# Patient Record
Sex: FEMALE | Race: WHITE | URBAN - NONMETROPOLITAN AREA
[De-identification: names, ages, dates, MRNs, and addresses within clinical notes are randomized per-mention and may not be internally consistent; named-entity substitution may affect disease eponyms.]

---

## 2019-06-06 ENCOUNTER — APPOINTMENT (OUTPATIENT)
Dept: URBAN - NONMETROPOLITAN AREA CLINIC 39 | Age: 54
Setting detail: DERMATOLOGY
End: 2019-06-06

## 2019-06-06 DIAGNOSIS — L91.8 OTHER HYPERTROPHIC DISORDERS OF THE SKIN: ICD-10-CM

## 2019-06-06 DIAGNOSIS — D18.0 HEMANGIOMA: ICD-10-CM

## 2019-06-06 DIAGNOSIS — L82.1 OTHER SEBORRHEIC KERATOSIS: ICD-10-CM

## 2019-06-06 DIAGNOSIS — L71.8 OTHER ROSACEA: ICD-10-CM

## 2019-06-06 PROBLEM — D18.01 HEMANGIOMA OF SKIN AND SUBCUTANEOUS TISSUE: Status: ACTIVE | Noted: 2019-06-06

## 2019-06-06 PROBLEM — M12.9 ARTHROPATHY, UNSPECIFIED: Status: ACTIVE | Noted: 2019-06-06

## 2019-06-06 PROCEDURE — OTHER PRESCRIPTION: OTHER

## 2019-06-06 PROCEDURE — OTHER REASSURANCE: OTHER

## 2019-06-06 PROCEDURE — OTHER MIPS QUALITY: OTHER

## 2019-06-06 PROCEDURE — 99202 OFFICE O/P NEW SF 15 MIN: CPT

## 2019-06-06 PROCEDURE — OTHER COUNSELING: OTHER

## 2019-06-06 RX ORDER — IVERMECTIN 10 MG/G
CREAM TOPICAL
Qty: 1 | Refills: 2 | Status: ERX | COMMUNITY
Start: 2019-06-06

## 2019-06-06 ASSESSMENT — LOCATION SIMPLE DESCRIPTION DERM
LOCATION SIMPLE: RIGHT UPPER BACK
LOCATION SIMPLE: CHEST
LOCATION SIMPLE: RIGHT CHEEK
LOCATION SIMPLE: LEFT CHEEK
LOCATION SIMPLE: LEFT UPPER BACK

## 2019-06-06 ASSESSMENT — LOCATION DETAILED DESCRIPTION DERM
LOCATION DETAILED: LEFT INFERIOR CENTRAL MALAR CHEEK
LOCATION DETAILED: UPPER STERNUM
LOCATION DETAILED: RIGHT INFERIOR CENTRAL MALAR CHEEK
LOCATION DETAILED: RIGHT MEDIAL UPPER BACK
LOCATION DETAILED: LEFT MEDIAL UPPER BACK

## 2019-06-06 ASSESSMENT — LOCATION ZONE DERM
LOCATION ZONE: TRUNK
LOCATION ZONE: FACE

## 2019-06-10 ENCOUNTER — RX ONLY (RX ONLY)
Age: 54
End: 2019-06-10

## 2019-06-10 RX ORDER — METRONIDAZOLE 7.5 MG/G
CREAM TOPICAL
Qty: 1 | Refills: 3 | Status: ERX | COMMUNITY
Start: 2019-06-10

## 2019-09-06 ENCOUNTER — APPOINTMENT (OUTPATIENT)
Dept: URBAN - NONMETROPOLITAN AREA CLINIC 39 | Age: 54
Setting detail: DERMATOLOGY
End: 2019-09-06

## 2019-09-06 DIAGNOSIS — L71.8 OTHER ROSACEA: ICD-10-CM

## 2019-09-06 PROCEDURE — OTHER PRESCRIPTION: OTHER

## 2019-09-06 PROCEDURE — 99212 OFFICE O/P EST SF 10 MIN: CPT

## 2019-09-06 PROCEDURE — OTHER RECOMMENDATIONS: OTHER

## 2019-09-06 PROCEDURE — OTHER COUNSELING: OTHER

## 2019-09-06 PROCEDURE — OTHER REASSURANCE: OTHER

## 2019-09-06 PROCEDURE — OTHER ADDITIONAL NOTES: OTHER

## 2019-09-06 PROCEDURE — OTHER MIPS QUALITY: OTHER

## 2019-09-06 RX ORDER — METRONIDAZOLE 7.5 MG/G
CREAM TOPICAL
Qty: 1 | Refills: 5 | Status: ERX | COMMUNITY
Start: 2019-09-06

## 2019-09-06 ASSESSMENT — LOCATION DETAILED DESCRIPTION DERM
LOCATION DETAILED: LEFT INFERIOR CENTRAL MALAR CHEEK
LOCATION DETAILED: RIGHT INFERIOR CENTRAL MALAR CHEEK

## 2019-09-06 ASSESSMENT — LOCATION SIMPLE DESCRIPTION DERM
LOCATION SIMPLE: LEFT CHEEK
LOCATION SIMPLE: RIGHT CHEEK

## 2019-09-06 ASSESSMENT — LOCATION ZONE DERM: LOCATION ZONE: FACE

## 2019-09-06 NOTE — PROCEDURE: ADDITIONAL NOTES
Detail Level: Zone
Additional Notes: Patient reports dry, sensitive skin.Patient provided with a handout with a list of recommended moisturizers.\\nDiscussed Rhofade with patient. Patient reports she is going to purchase OTC nasal spray with oxymetazoline and try using that on her face. Pt notes she has done lots of research and heard of people who have success with this. Patient reports if she notices improvement, she will contact the office if interested in a prescription strength oxymetazoline. \\nPatient already doing IPL laser every six months. Pt looking for more day-to-day management of flushing symptoms.

## 2020-03-09 ENCOUNTER — APPOINTMENT (OUTPATIENT)
Dept: URBAN - NONMETROPOLITAN AREA CLINIC 39 | Age: 55
Setting detail: DERMATOLOGY
End: 2020-03-09

## 2020-03-09 DIAGNOSIS — L71.8 OTHER ROSACEA: ICD-10-CM

## 2020-03-09 PROCEDURE — OTHER PRESCRIPTION: OTHER

## 2020-03-09 PROCEDURE — OTHER MIPS QUALITY: OTHER

## 2020-03-09 PROCEDURE — OTHER REASSURANCE: OTHER

## 2020-03-09 PROCEDURE — OTHER ADDITIONAL NOTES: OTHER

## 2020-03-09 PROCEDURE — 99212 OFFICE O/P EST SF 10 MIN: CPT

## 2020-03-09 PROCEDURE — OTHER COUNSELING: OTHER

## 2020-03-09 PROCEDURE — OTHER RECOMMENDATIONS: OTHER

## 2020-03-09 RX ORDER — METRONIDAZOLE 7.5 MG/G
CREAM TOPICAL
Qty: 1 | Refills: 5 | Status: ERX

## 2020-03-09 ASSESSMENT — LOCATION ZONE DERM: LOCATION ZONE: FACE

## 2020-03-09 ASSESSMENT — LOCATION SIMPLE DESCRIPTION DERM
LOCATION SIMPLE: LEFT CHEEK
LOCATION SIMPLE: RIGHT CHEEK

## 2020-03-09 NOTE — PROCEDURE: ADDITIONAL NOTES
Detail Level: Detailed
Additional Notes: Pt reports she has cut out all animal protein in her diet and began using natural skincare products and this has helped minimize her rosacea flares. Pt still reports some dryness. Pt instructed to continue moisturizing daily. Pt counseled on proper on application of skincare products- serums-moisturizers-oil. Pt states she has been applying her vitamin E oil first. \\nPt interested in IPL treatment. Offered referral to [facility], pt declines at this time. \\nPt also interested in dysport/Botox cosmetic treatment. Pt instructed to schedule back for cosmetic consultation and treatment (30 minute appointment).

## 2020-09-08 ENCOUNTER — APPOINTMENT (OUTPATIENT)
Dept: URBAN - NONMETROPOLITAN AREA CLINIC 39 | Age: 55
Setting detail: DERMATOLOGY
End: 2020-09-08

## 2020-09-08 DIAGNOSIS — D485 NEOPLASM OF UNCERTAIN BEHAVIOR OF SKIN: ICD-10-CM

## 2020-09-08 DIAGNOSIS — L71.8 OTHER ROSACEA: ICD-10-CM

## 2020-09-08 DIAGNOSIS — L82.1 OTHER SEBORRHEIC KERATOSIS: ICD-10-CM

## 2020-09-08 DIAGNOSIS — L82.0 INFLAMED SEBORRHEIC KERATOSIS: ICD-10-CM

## 2020-09-08 PROBLEM — D48.5 NEOPLASM OF UNCERTAIN BEHAVIOR OF SKIN: Status: ACTIVE | Noted: 2020-09-08

## 2020-09-08 PROCEDURE — OTHER BENIGN DESTRUCTION: OTHER

## 2020-09-08 PROCEDURE — 17110 DESTRUCT B9 LESION 1-14: CPT

## 2020-09-08 PROCEDURE — OTHER RECOMMENDATIONS: OTHER

## 2020-09-08 PROCEDURE — OTHER BIOPSY BY SHAVE METHOD: OTHER

## 2020-09-08 PROCEDURE — 11102 TANGNTL BX SKIN SINGLE LES: CPT | Mod: 59

## 2020-09-08 PROCEDURE — OTHER COUNSELING: OTHER

## 2020-09-08 PROCEDURE — 11103 TANGNTL BX SKIN EA SEP/ADDL: CPT | Mod: 59

## 2020-09-08 PROCEDURE — OTHER PRESCRIPTION: OTHER

## 2020-09-08 PROCEDURE — 99213 OFFICE O/P EST LOW 20 MIN: CPT | Mod: 25

## 2020-09-08 PROCEDURE — OTHER ADDITIONAL NOTES: OTHER

## 2020-09-08 PROCEDURE — OTHER MIPS QUALITY: OTHER

## 2020-09-08 PROCEDURE — OTHER REASSURANCE: OTHER

## 2020-09-08 RX ORDER — METRONIDAZOLE 7.5 MG/G
CREAM TOPICAL
Qty: 1 | Refills: 5 | Status: ERX

## 2020-09-08 ASSESSMENT — LOCATION SIMPLE DESCRIPTION DERM
LOCATION SIMPLE: RIGHT SHOULDER
LOCATION SIMPLE: RIGHT THIGH
LOCATION SIMPLE: RIGHT CHEEK
LOCATION SIMPLE: LEFT CHEEK
LOCATION SIMPLE: RIGHT PRETIBIAL REGION
LOCATION SIMPLE: SCALP

## 2020-09-08 ASSESSMENT — LOCATION DETAILED DESCRIPTION DERM
LOCATION DETAILED: RIGHT INFERIOR CENTRAL MALAR CHEEK
LOCATION DETAILED: LEFT INFERIOR CENTRAL MALAR CHEEK
LOCATION DETAILED: RIGHT ANTERIOR PROXIMAL THIGH
LOCATION DETAILED: LEFT INFERIOR POSTAURICULAR SKIN
LOCATION DETAILED: RIGHT ANTERIOR SHOULDER
LOCATION DETAILED: RIGHT MEDIAL DISTAL PRETIBIAL REGION

## 2020-09-08 ASSESSMENT — LOCATION ZONE DERM
LOCATION ZONE: FACE
LOCATION ZONE: LEG
LOCATION ZONE: ARM
LOCATION ZONE: SCALP

## 2020-09-08 NOTE — PROCEDURE: BENIGN DESTRUCTION
Consent: The patient's consent was obtained including but not limited to risks of crusting, scabbing, blistering, scarring, darker or lighter pigmentary change, recurrence, incomplete removal and infection.
Post-Care Instructions: I reviewed with the patient in detail post-care instructions. Patient is to wear sunprotection, and avoid picking at any of the treated lesions. Pt may apply Vaseline to crusted or scabbing areas.
Include Z78.9 (Other Specified Conditions Influencing Health Status) As An Associated Diagnosis?: Yes
Render Post-Care Instructions In Note?: no
Detail Level: Detailed
Treatment Number (Will Not Render If 0): 0
Anesthesia Volume In Cc: 0.5
Medical Necessity Clause: This procedure was medically necessary because the lesions that were treated were:
Medical Necessity Information: It is in your best interest to select a reason for this procedure from the list below. All of these items fulfill various CMS LCD requirements except the new and changing color options.

## 2020-09-08 NOTE — HPI: SKIN LESION
Has Your Skin Lesion Been Treated?: not been treated
Is This A New Presentation, Or A Follow-Up?: Skin Lesion
How Severe Is Your Skin Lesion?: moderate
How Severe Is Your Skin Lesion?: mild
Is This A New Presentation, Or A Follow-Up?: Skin Lesions

## 2020-09-08 NOTE — PROCEDURE: ADDITIONAL NOTES
Detail Level: Detailed
Additional Notes: Acneiform component of rosacea improved significantly, erythrotelangiectatic component remains unchanged. Discussed treatments for erythema again. Pt states she has “money left to spend” at her previous dermatology office on laser treatments. Pt states she will schedule for laser treatment. Offered referral to [facility], pt declines at this time. Pt to continue metronidazole cream BID. Refills sent today.

## 2020-09-08 NOTE — PROCEDURE: BIOPSY BY SHAVE METHOD
Electrodesiccation Text: The wound bed was treated with electrodesiccation after the biopsy was performed.
Hide Anticipated Plan (Based On Presumed Biopsy Results)?: No
Size Of Lesion In Cm: 0.7
Biopsy Type: H and E
Detail Level: Detailed
Notification Instructions: Patient will be notified of biopsy results. However, patient instructed to call the office if not contacted within 2 weeks.
Silver Nitrate Text: The wound bed was treated with silver nitrate after the biopsy was performed.
Validate Note Data (See Information Below): Yes
Anesthesia Type: 1% Xylocaine with epinephrine
Cryotherapy Text: The wound bed was treated with cryotherapy after the biopsy was performed.
Information: Selecting Yes will display possible errors in your note based on the variables you have selected. This validation is only offered as a suggestion for you. PLEASE NOTE THAT THE VALIDATION TEXT WILL BE REMOVED WHEN YOU FINALIZE YOUR NOTE. IF YOU WANT TO FAX A PRELIMINARY NOTE YOU WILL NEED TO TOGGLE THIS TO 'NO' IF YOU DO NOT WANT IT IN YOUR FAXED NOTE.
Additional Anesthesia Volume In Cc (Will Not Render If 0): 0
Hemostasis: Macario's
Type Of Destruction Used: Electrodesiccation
Wound Care: Petrolatum
Consent: Written consent was obtained and risks were reviewed including but not limited to scarring, infection, bleeding, scabbing, incomplete removal, nerve damage and allergy to anesthesia.
Electrodesiccation And Curettage Text: The wound bed was treated with electrodesiccation and curettage after the biopsy was performed.
Post-Care Instructions: I reviewed with the patient in detail post-care instructions. Patient is to keep the biopsy site dry overnight, and then apply bacitracin twice daily until healed. Patient may apply hydrogen peroxide soaks to remove any crusting.
Billing Type: Third-Party Bill
Path Notes (To The Dermatopathologist): 7mm
Biopsy Method: Dermablade
Anesthesia Volume In Cc (Will Not Render If 0): 1
Curettage Text: The wound bed was treated with curettage after the biopsy was performed.
Depth Of Biopsy: dermis
Dressing: bandage
Size Of Lesion In Cm: 0.5
Path Notes (To The Dermatopathologist): 5mm

## 2021-03-08 ENCOUNTER — APPOINTMENT (OUTPATIENT)
Dept: URBAN - NONMETROPOLITAN AREA CLINIC 39 | Age: 56
Setting detail: DERMATOLOGY
End: 2021-03-09

## 2021-03-08 DIAGNOSIS — L71.8 OTHER ROSACEA: ICD-10-CM

## 2021-03-08 PROCEDURE — OTHER PRESCRIPTION: OTHER

## 2021-03-08 PROCEDURE — OTHER MIPS QUALITY: OTHER

## 2021-03-08 PROCEDURE — OTHER COUNSELING: OTHER

## 2021-03-08 PROCEDURE — OTHER RECOMMENDATIONS: OTHER

## 2021-03-08 PROCEDURE — OTHER ADDITIONAL NOTES: OTHER

## 2021-03-08 PROCEDURE — 99214 OFFICE O/P EST MOD 30 MIN: CPT

## 2021-03-08 RX ORDER — IVERMECTIN 10 MG/G
CREAM TOPICAL
Qty: 1 | Refills: 2 | Status: ERX | COMMUNITY
Start: 2021-03-08

## 2021-03-08 ASSESSMENT — LOCATION DETAILED DESCRIPTION DERM
LOCATION DETAILED: RIGHT INFERIOR CENTRAL MALAR CHEEK
LOCATION DETAILED: LEFT INFERIOR CENTRAL MALAR CHEEK

## 2021-03-08 ASSESSMENT — LOCATION SIMPLE DESCRIPTION DERM
LOCATION SIMPLE: RIGHT CHEEK
LOCATION SIMPLE: LEFT CHEEK

## 2021-03-08 ASSESSMENT — LOCATION ZONE DERM: LOCATION ZONE: FACE

## 2021-03-08 NOTE — PROCEDURE: RECOMMENDATIONS
Detail Level: Zone
Recommendation Preamble: The following recommendations were made: broad-spectrum SPF 30 or greater daily, reapply at least every 2 hours.
Render Risk Assessment In Note?: no

## 2021-03-08 NOTE — PROCEDURE: ADDITIONAL NOTES
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 Pt states metronidazole cream was causing eye twitching, headaches, mental fog and fatigue with daily use. Pt states she decreased use of metronidazole cream and only applies it every few days now. Pt states symptoms have improved with decreased use but rosacea is not as well controlled. Patient to discontinue metronidazole cream and begin using soolantro 1% cream. Samples provided today and prescription sent to [facility]. Pt verbalizes understanding and agreement with plan. 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 Pt states metronidazole cream was causing eye twitching, headaches, mental fog and fatigue with daily use. Pt states she decreased use of metronidazole cream and only applies it every few days now. Pt states symptoms have improved with decreased use but rosacea is not as well controlled. Patient to discontinue metronidazole cream and begin using soolantro 1% cream. Samples provided today and prescription sent to [facility]. Pt verbalizes understanding and agreement with plan.

## 2021-12-07 ENCOUNTER — RX ONLY (RX ONLY)
Age: 56
End: 2021-12-07

## 2021-12-07 RX ORDER — IVERMECTIN 10 MG/G
CREAM TOPICAL
Qty: 45 | Refills: 0 | Status: ERX | COMMUNITY
Start: 2021-12-07